# Patient Record
Sex: FEMALE | Race: OTHER | NOT HISPANIC OR LATINO
[De-identification: names, ages, dates, MRNs, and addresses within clinical notes are randomized per-mention and may not be internally consistent; named-entity substitution may affect disease eponyms.]

---

## 2021-06-02 ENCOUNTER — APPOINTMENT (OUTPATIENT)
Dept: BREAST CENTER | Facility: CLINIC | Age: 56
End: 2021-06-02
Payer: COMMERCIAL

## 2021-06-02 VITALS
WEIGHT: 155 LBS | HEIGHT: 61 IN | HEART RATE: 105 BPM | SYSTOLIC BLOOD PRESSURE: 166 MMHG | BODY MASS INDEX: 29.27 KG/M2 | DIASTOLIC BLOOD PRESSURE: 101 MMHG

## 2021-06-02 PROCEDURE — 99072 ADDL SUPL MATRL&STAF TM PHE: CPT

## 2021-06-02 PROCEDURE — 99203 OFFICE O/P NEW LOW 30 MIN: CPT

## 2021-06-03 NOTE — REASON FOR VISIT
[Consultation] : a consultation visit [FreeTextEntry1] : Pt  c/o  L breast  pain and Lump in the  axillary

## 2021-06-03 NOTE — REVIEW OF SYSTEMS
[Breast Pain] : breast pain [As Noted in HPI] : as noted in HPI [Skin Lesions] : no skin lesions [Skin Wound] : no skin wound [Negative] : Integumentary [de-identified] : axillary lump and pain

## 2021-06-03 NOTE — DATA REVIEWED
[FreeTextEntry1] : -- 9/16/2020 (R) b/l dx mmg and US: heterogenously dense breasts. No mammographic or ultrasonographic evidence of malignancy. BI-RADS 2.

## 2021-06-03 NOTE — HISTORY OF PRESENT ILLNESS
[FreeTextEntry1] : 54 yo female referred by CALEB Argueta presents for palpable abnormality in her left axilla, present for about 1.5 years, associated with intermittent pain. She also states that she feels as though her left breast is swollen. Reports that she had a benign left breast biopsy in the past. Pt denies palpable breast masses, skin lesions/changes, nipple discharge, or other breast complaints.\par \par JAEL is 11.2%\par \par 2nd dose of COVID vaccination was in March\par \par Discussed etiologies of breast pain including hormonal changes, benign entities such as cysts, reactive lymph nodes, musculoskeletal issues and rarely malignancy. Recommended keeping a diary of when the breast pain comes on to narrow down the cause. Recommended NSAIDs if the pain is intolerable, or the alternating of warm/cold packs. Patient understands and will try these.\par \par \par

## 2021-06-03 NOTE — PAST MEDICAL HISTORY
[Menarche Age ____] : age at menarche was [unfilled] [Menopause Age____] : age at menopause was [unfilled] [Total Preg ___] : G[unfilled] [Live Births ___] : P[unfilled]  [Abortions ___] : Abortions:[unfilled] [Age At Live Birth ___] : Age at live birth: [unfilled] [History of Hormone Replacement Treatment] : has no history of hormone replacement treatment [FreeTextEntry7] : yes  [FreeTextEntry8] : yes

## 2021-08-30 ENCOUNTER — NON-APPOINTMENT (OUTPATIENT)
Age: 56
End: 2021-08-30

## 2021-09-08 ENCOUNTER — APPOINTMENT (OUTPATIENT)
Dept: BREAST CENTER | Facility: CLINIC | Age: 56
End: 2021-09-08
Payer: COMMERCIAL

## 2021-09-08 VITALS
HEART RATE: 65 BPM | SYSTOLIC BLOOD PRESSURE: 163 MMHG | DIASTOLIC BLOOD PRESSURE: 92 MMHG | WEIGHT: 155 LBS | HEIGHT: 61 IN | BODY MASS INDEX: 29.27 KG/M2

## 2021-09-08 DIAGNOSIS — N63.20 UNSPECIFIED LUMP IN THE LEFT BREAST, UNSPECIFIED QUADRANT: ICD-10-CM

## 2021-09-08 DIAGNOSIS — Z78.9 OTHER SPECIFIED HEALTH STATUS: ICD-10-CM

## 2021-09-08 DIAGNOSIS — N64.4 MASTODYNIA: ICD-10-CM

## 2021-09-08 PROCEDURE — 99213 OFFICE O/P EST LOW 20 MIN: CPT

## 2021-09-08 RX ORDER — HYDROCHLOROTHIAZIDE 12.5 MG/1
TABLET ORAL
Refills: 0 | Status: ACTIVE | COMMUNITY

## 2021-09-08 NOTE — HISTORY OF PRESENT ILLNESS
[FreeTextEntry1] : 56 yo female referred by CALEB Argueta presents for follow up of palpable abnormality in her left axilla, present for about 1.5 years, associated with intermittent pain. She also states that she feels as though her left breast is swollen. Reports that she had a benign left breast biopsy in the past. Today pt denies palpable breast masses, skin lesions/changes, nipple discharge, or other breast complaints. Imaging today was benign.\par \par JAEL is 11.2%\par \par 2nd dose of COVID vaccination was in March\par \par \par

## 2021-09-08 NOTE — DATA REVIEWED
[FreeTextEntry1] : \par 09/08/2021 ([facility]) b/l sMmg/US: heterogeneously dense; US- R- stable 0.3 cm mass 9:00 5FN, benign appearing axillary lymph node, BIRADS 2

## 2022-09-09 ENCOUNTER — NON-APPOINTMENT (OUTPATIENT)
Age: 57
End: 2022-09-09

## 2022-09-13 ENCOUNTER — APPOINTMENT (OUTPATIENT)
Dept: BREAST CENTER | Facility: CLINIC | Age: 57
End: 2022-09-13

## 2023-05-31 ENCOUNTER — APPOINTMENT (OUTPATIENT)
Dept: ORTHOPEDIC SURGERY | Facility: CLINIC | Age: 58
End: 2023-05-31
Payer: COMMERCIAL

## 2023-05-31 VITALS
SYSTOLIC BLOOD PRESSURE: 165 MMHG | OXYGEN SATURATION: 99 % | HEART RATE: 78 BPM | HEIGHT: 61 IN | WEIGHT: 162 LBS | BODY MASS INDEX: 30.58 KG/M2 | DIASTOLIC BLOOD PRESSURE: 92 MMHG

## 2023-05-31 PROCEDURE — 72083 X-RAY EXAM ENTIRE SPI 4/5 VW: CPT

## 2023-05-31 PROCEDURE — 99204 OFFICE O/P NEW MOD 45 MIN: CPT

## 2023-05-31 RX ORDER — ETODOLAC 400 MG/1
400 TABLET, FILM COATED ORAL
Qty: 60 | Refills: 0 | Status: ACTIVE | COMMUNITY
Start: 2023-05-31 | End: 1900-01-01

## 2023-06-16 NOTE — HISTORY OF PRESENT ILLNESS
[de-identified] : Here for spinal consultation.\par Reports pain in right low back and leg.  7/10 intensity.  constant and worsening.  worse with standing and activity in general\par Denies numbness/tingling/weankness in lower extremities\par denies bowel or bladder symtpoms\par has not had treatment.

## 2023-06-16 NOTE — PHYSICAL EXAM
[de-identified] : Physical Exam:\par \par General: patient is well developed, well nourished, in no acute \par distress, alert and oriented x 3. \par \par Mood and affect: normal\par \par Respiratory: no respiratory distress noted\par \par Skin: no scars over spine, skin intact, no erythema, increased warmth\par \par Alignment:The spine is well compensated in the coronal and sagittal plane.  \par \par Gait: The patient is able to toe walk and heel walk without difficulty. The patient is able to tandem gait without difficutly.\par \par Palpation: no tenderness to palpation spine or paraspinal region\par \par Range of motion: Lumbar spine ROM is restricted\par \par Neurologic Exam:\par Motor: Manual Muscle testing in the lower extremities is 5 out of 5 in all muscle groups. There is no evidence of muscular atrophy in the lower extremities. Sensory: Sensation to light touch is grossly intact in the lower extremities\par \par Reflexes: DTR are present and symmetric throughout, no clonus, plantar responses are flexor\par \par Hip Exam: No pain with internal or external rotation of hips bilaterally\par \par Special tests: Straight leg raise test negative.  Cross straight leg test negative.  ADAM test negative\par \par Vascular: Examination of the peripheral vascular system demonstrates no evidence of congestion or edema. no evidence of lymphedema bilateral lower extremities, pulses are present and symmetric in both lower extremities. [de-identified] : Xray 5/31/23: mild disc degneration and spondylosis seen in lumbar.  no fractures or instability.  no scoliosis

## 2023-06-16 NOTE — DISCUSSION/SUMMARY
[de-identified] : Low back pain\par Lumbar radiculopathy\par Rx given for etodolac, medication instructions given\par Rx given for PT\par Follow up in 4-6 weeks, if not improved will get lumbar mri.\par all questions answered

## 2023-07-12 ENCOUNTER — APPOINTMENT (OUTPATIENT)
Dept: ORTHOPEDIC SURGERY | Facility: CLINIC | Age: 58
End: 2023-07-12
Payer: COMMERCIAL

## 2023-07-12 DIAGNOSIS — M51.36 OTHER INTERVERTEBRAL DISC DEGENERATION, LUMBAR REGION: ICD-10-CM

## 2023-07-12 DIAGNOSIS — M54.16 RADICULOPATHY, LUMBAR REGION: ICD-10-CM

## 2023-07-12 PROCEDURE — 99214 OFFICE O/P EST MOD 30 MIN: CPT

## 2023-07-12 RX ORDER — ETODOLAC 400 MG/1
400 TABLET, FILM COATED ORAL
Qty: 60 | Refills: 0 | Status: ACTIVE | COMMUNITY
Start: 2023-07-12 | End: 1900-01-01

## 2023-07-12 RX ORDER — DIAZEPAM 5 MG/1
5 TABLET ORAL
Qty: 1 | Refills: 0 | Status: ACTIVE | COMMUNITY
Start: 2023-07-12 | End: 1900-01-01

## 2023-07-12 NOTE — PHYSICAL EXAM
[de-identified] : Physical Exam:\par \par General: patient is well developed, well nourished, in no acute\par distress, alert and oriented x 3.\par \par Mood and affect: normal\par \par Respiratory: no respiratory distress noted\par \par Skin: no scars over spine, skin intact, no erythema, increased warmth\par \par Alignment: The spine is well compensated in the coronal and sagittal plane.\par \par Gait: The patient is able to toe walk and heel walk without difficulty. The patient is able to tandem gait without difficulty.\par \par Palpation: Tender to palpation right lumbar paraspinal region.\par \par Range of motion: Lumbar spine ROM is restricted.\par \par Neurologic Exam:\par Motor: Manual Muscle testing in the lower extremities is 5 out of 5 in all muscle groups. There is no evidence of muscular\par atrophy in the lower extremities. Sensory: Sensation to light touch is grossly intact in the lower extremities\par \par Reflexes: DTR are present and symmetric throughout, no clonus, plantar responses are flexor\par \par Hip Exam: No pain with internal or external rotation of hips bilaterally\par \par Special tests: Straight leg raise test negative. Cross straight leg test negative. ADAM test negative\par \par Vascular: Examination of the peripheral vascular system demonstrates no evidence of congestion or edema. no evidence of\par lymphedema bilateral lower extremities, pulses are present and symmetric in both lower extremities. [de-identified] : Xray 5/31/23: mild disc degneration and spondylosis seen in lumbar.  no fractures or instability.  no scoliosis

## 2023-07-12 NOTE — END OF VISIT
[FreeTextEntry3] : All medical record entries made by the Scribe were at my, Dr. Venu Whiting, direction and personally dictated by me on 07/12/2023. I have reviewed the chart and agree that the record accurately reflects my personal performance of the history, physical exam, assessment and plan. I have also personally directed, reviewed, and agreed with the chart.

## 2023-07-12 NOTE — REASON FOR VISIT
[Follow-Up Visit] : a follow-up visit for [Back Pain] : back pain [Interpreters_IDNumber] : [id number] [Interpreters_FullName] : Jeremy [TWNoteComboBox1] : [place]

## 2023-07-12 NOTE — ADDENDUM
[FreeTextEntry1] : Documented by Moniqeu Robles acting as a scribe for Dr. Venu Whiting on 07/12/2023.

## 2023-07-12 NOTE — HISTORY OF PRESENT ILLNESS
[de-identified] : Follow up 07/12/2023: Ms. Valerio presents for follow up. She has completed a course of physical therapy since her last visit. She reports no improvement of her right sided low back pain that radiates laterally down her right lower extremity. She also reports a new onset of left lower extremity radiating pain which onset last week. She was seen at the [facility] emergency department last Thursday for her new onset left lower extremity pain and was discharged with oral muscle relaxants and prednisone. She states her left lower extremity pain has significantly improved. However, her low back pain and right lower extremity pain remains unchanged.\par \par Initial 05/31/2023: Here for spinal consultation.\par Reports pain in right low back and leg.  7/10 intensity.  constant and worsening.  worse with standing and activity in general\par Denies numbness/tingling/weakness in lower extremities\par denies bowel or bladder symptoms\par has not had treatment.

## 2023-07-20 RX ORDER — DIAZEPAM 5 MG/1
5 TABLET ORAL
Qty: 1 | Refills: 0 | Status: ACTIVE | COMMUNITY
Start: 2023-07-20 | End: 1900-01-01

## 2023-08-15 ENCOUNTER — APPOINTMENT (OUTPATIENT)
Dept: ORTHOPEDIC SURGERY | Facility: CLINIC | Age: 58
End: 2023-08-15

## 2023-09-18 PROBLEM — M54.16 LUMBAR RADICULOPATHY, RIGHT: Status: ACTIVE | Noted: 2023-05-31

## 2023-09-18 PROBLEM — M51.36 DISC DEGENERATION, LUMBAR: Status: ACTIVE | Noted: 2023-09-18
